# Patient Record
Sex: FEMALE | Race: WHITE | Employment: OTHER | URBAN - METROPOLITAN AREA
[De-identification: names, ages, dates, MRNs, and addresses within clinical notes are randomized per-mention and may not be internally consistent; named-entity substitution may affect disease eponyms.]

---

## 2021-04-21 ENCOUNTER — RECORDS - HEALTHEAST (OUTPATIENT)
Dept: LAB | Facility: CLINIC | Age: 66
End: 2021-04-21

## 2021-04-22 ENCOUNTER — RECORDS - HEALTHEAST (OUTPATIENT)
Dept: LAB | Facility: CLINIC | Age: 66
End: 2021-04-22

## 2021-04-23 LAB
ALBUMIN SERPL-MCNC: 2.1 G/DL (ref 3.5–5)
ALP SERPL-CCNC: 99 U/L (ref 45–120)
ALT SERPL W P-5'-P-CCNC: 13 U/L (ref 0–45)
ANION GAP SERPL CALCULATED.3IONS-SCNC: 8 MMOL/L (ref 5–18)
AST SERPL W P-5'-P-CCNC: 45 U/L (ref 0–40)
BILIRUB SERPL-MCNC: 4.4 MG/DL (ref 0–1)
BUN SERPL-MCNC: 10 MG/DL (ref 8–22)
CALCIUM SERPL-MCNC: 7.8 MG/DL (ref 8.5–10.5)
CHLORIDE BLD-SCNC: 105 MMOL/L (ref 98–107)
CO2 SERPL-SCNC: 21 MMOL/L (ref 22–31)
CREAT SERPL-MCNC: 0.55 MG/DL (ref 0.6–1.1)
ERYTHROCYTE [DISTWIDTH] IN BLOOD BY AUTOMATED COUNT: 17.9 % (ref 11–14.5)
GAMMA INTERFERON BACKGROUND BLD IA-ACNC: 0.04 IU/ML
GFR SERPL CREATININE-BSD FRML MDRD: >60 ML/MIN/1.73M2
GLUCOSE BLD-MCNC: 62 MG/DL (ref 70–125)
HCT VFR BLD AUTO: 24.7 % (ref 35–47)
HGB BLD-MCNC: 7.9 G/DL (ref 12–16)
M TB IFN-G BLD-IMP: NEGATIVE
MCH RBC QN AUTO: 32.4 PG (ref 27–34)
MCHC RBC AUTO-ENTMCNC: 32 G/DL (ref 32–36)
MCV RBC AUTO: 101 FL (ref 80–100)
MITOGEN IGNF BCKGRD COR BLD-ACNC: -0.01 IU/ML
MITOGEN IGNF BCKGRD COR BLD-ACNC: 0 IU/ML
PLATELET # BLD AUTO: 264 THOU/UL (ref 140–440)
PMV BLD AUTO: 9.2 FL (ref 8.5–12.5)
POTASSIUM BLD-SCNC: 3.4 MMOL/L (ref 3.5–5)
PROT SERPL-MCNC: 6.2 G/DL (ref 6–8)
QTF INTERPRETATION: NORMAL
QTF MITOGEN - NIL: 1.88 IU/ML
RBC # BLD AUTO: 2.44 MILL/UL (ref 3.8–5.4)
SODIUM SERPL-SCNC: 134 MMOL/L (ref 136–145)
WBC: 8.2 THOU/UL (ref 4–11)

## 2021-04-27 DIAGNOSIS — Z11.59 ENCOUNTER FOR SCREENING FOR OTHER VIRAL DISEASES: ICD-10-CM

## 2021-04-28 ENCOUNTER — RECORDS - HEALTHEAST (OUTPATIENT)
Dept: LAB | Facility: CLINIC | Age: 66
End: 2021-04-28

## 2021-04-28 LAB
C DIFF TOX B STL QL: NEGATIVE
RIBOTYPE 027/NAP1/BI: NORMAL

## 2021-05-01 ENCOUNTER — RECORDS - HEALTHEAST (OUTPATIENT)
Dept: LAB | Facility: CLINIC | Age: 66
End: 2021-05-01

## 2021-05-03 ENCOUNTER — HOSPITAL ENCOUNTER (OUTPATIENT)
Dept: ULTRASOUND IMAGING | Facility: CLINIC | Age: 66
Discharge: HOME | End: 2021-05-03
Attending: GENERAL PRACTICE | Admitting: GENERAL PRACTICE
Payer: MEDICARE

## 2021-05-03 VITALS — DIASTOLIC BLOOD PRESSURE: 69 MMHG | SYSTOLIC BLOOD PRESSURE: 132 MMHG

## 2021-05-03 DIAGNOSIS — K70.30 ALCOHOLIC CIRRHOSIS OF LIVER (H): ICD-10-CM

## 2021-05-03 LAB — HGB BLD-MCNC: 7.4 G/DL (ref 12–16)

## 2021-05-03 PROCEDURE — 250N000009 HC RX 250: Performed by: RADIOLOGY

## 2021-05-03 PROCEDURE — 49083 ABD PARACENTESIS W/IMAGING: CPT

## 2021-05-03 RX ADMIN — LIDOCAINE HYDROCHLORIDE 10 ML: 10 INJECTION, SOLUTION EPIDURAL; INFILTRATION; INTRACAUDAL; PERINEURAL at 12:55

## 2021-05-03 NOTE — PROCEDURES
[facility]    Procedure: Paracentesis.     Date/Time: 5/3/2021 1:18 PM  Performed by: Silvina Torres DO  Authorized by: Silvina Torres DO     UNIVERSAL PROTOCOL   Site Marked: Yes  Prior Images Obtained and Reviewed:  Yes  Required items: Required blood products, implants, devices and special equipment available    Patient identity confirmed:  Verbally with patient, arm band, provided demographic data and hospital-assigned identification number  Patient was reevaluated immediately before administering moderate or deep sedation or anesthesia  Confirmation Checklist:  Patient's identity using two indicators, relevant allergies, procedure was appropriate and matched the consent or emergent situation and correct equipment/implants were available  Time out: Immediately prior to the procedure a time out was called    Universal Protocol: the Joint Commission Universal Protocol was followed    Preparation: Patient was prepped and draped in usual sterile fashion           ANESTHESIA    Anesthesia: Local infiltration  Local Anesthetic:  Lidocaine 1% without epinephrine      SEDATION    Patient Sedated: No    See dictated procedure note for full details.  Findings: Paracentesis.     Specimens: none    Complications: None    Condition: Stable    PROCEDURE   Patient Tolerance:  Patient tolerated the procedure well with no immediate complications    Length of time physician/provider present for 1:1 monitoring during sedation: 0

## 2021-05-10 ENCOUNTER — RECORDS - HEALTHEAST (OUTPATIENT)
Dept: LAB | Facility: CLINIC | Age: 66
End: 2021-05-10

## 2021-05-10 LAB
C DIFF TOX B STL QL: NEGATIVE
RIBOTYPE 027/NAP1/BI: NORMAL

## 2021-05-12 ENCOUNTER — RECORDS - HEALTHEAST (OUTPATIENT)
Dept: LAB | Facility: CLINIC | Age: 66
End: 2021-05-12

## 2021-05-13 ENCOUNTER — HOSPITAL ENCOUNTER (OUTPATIENT)
Facility: CLINIC | Age: 66
Setting detail: OBSERVATION
Discharge: NURSING, REHAB OR LONG-TERM CARE | End: 2021-05-14
Attending: EMERGENCY MEDICINE | Admitting: INTERNAL MEDICINE
Payer: MEDICARE

## 2021-05-13 DIAGNOSIS — D64.9 ANEMIA, UNSPECIFIED TYPE: ICD-10-CM

## 2021-05-13 DIAGNOSIS — K70.31 ALCOHOLIC CIRRHOSIS OF LIVER WITH ASCITES (H): ICD-10-CM

## 2021-05-13 DIAGNOSIS — M62.81 GENERALIZED MUSCLE WEAKNESS: ICD-10-CM

## 2021-05-13 LAB
ABO + RH BLD: NORMAL
ABO + RH BLD: NORMAL
ALBUMIN SERPL-MCNC: 1.9 G/DL (ref 3.5–5)
ALBUMIN SERPL-MCNC: 2.1 G/DL (ref 3.4–5)
ALP SERPL-CCNC: 122 U/L (ref 45–120)
ALP SERPL-CCNC: 133 U/L (ref 40–150)
ALT SERPL W P-5'-P-CCNC: 22 U/L (ref 0–45)
ALT SERPL W P-5'-P-CCNC: 33 U/L (ref 0–50)
AMMONIA PLAS-SCNC: 58 UMOL/L (ref 10–50)
ANION GAP SERPL CALCULATED.3IONS-SCNC: 5 MMOL/L (ref 3–14)
ANION GAP SERPL CALCULATED.3IONS-SCNC: 8 MMOL/L (ref 5–18)
APPEARANCE FLD: CLEAR
AST SERPL W P-5'-P-CCNC: 49 U/L (ref 0–40)
AST SERPL W P-5'-P-CCNC: 95 U/L (ref 0–45)
BASOPHILS # BLD AUTO: 0.1 10E9/L (ref 0–0.2)
BASOPHILS NFR BLD AUTO: 0.8 %
BILIRUB SERPL-MCNC: 2.3 MG/DL (ref 0–1)
BILIRUB SERPL-MCNC: 2.8 MG/DL (ref 0.2–1.3)
BLD GP AB SCN SERPL QL: NORMAL
BLD PROD TYP BPU: NORMAL
BLD PROD TYP BPU: NORMAL
BLD UNIT ID BPU: 0
BLOOD BANK CMNT PATIENT-IMP: NORMAL
BLOOD PRODUCT CODE: NORMAL
BPU ID: NORMAL
BUN SERPL-MCNC: 10 MG/DL (ref 7–30)
BUN SERPL-MCNC: 13 MG/DL (ref 8–22)
CALCIUM SERPL-MCNC: 7.6 MG/DL (ref 8.5–10.5)
CALCIUM SERPL-MCNC: 8.1 MG/DL (ref 8.5–10.1)
CHLORIDE BLD-SCNC: 103 MMOL/L (ref 98–107)
CHLORIDE SERPL-SCNC: 103 MMOL/L (ref 94–109)
CO2 SERPL-SCNC: 25 MMOL/L (ref 20–32)
CO2 SERPL-SCNC: 25 MMOL/L (ref 22–31)
COLOR FLD: YELLOW
CREAT SERPL-MCNC: 0.65 MG/DL (ref 0.52–1.04)
CREAT SERPL-MCNC: 0.65 MG/DL (ref 0.6–1.1)
DIFFERENTIAL METHOD BLD: ABNORMAL
EOSINOPHIL # BLD AUTO: 0.2 10E9/L (ref 0–0.7)
EOSINOPHIL NFR BLD AUTO: 2 %
ERYTHROCYTE [DISTWIDTH] IN BLOOD BY AUTOMATED COUNT: 15.9 % (ref 11–14.5)
ERYTHROCYTE [DISTWIDTH] IN BLOOD BY AUTOMATED COUNT: 16.1 % (ref 10–15)
FERRITIN SERPL-MCNC: 287 NG/ML (ref 8–252)
GFR SERPL CREATININE-BSD FRML MDRD: >60 ML/MIN/1.73M2
GFR SERPL CREATININE-BSD FRML MDRD: >90 ML/MIN/{1.73_M2}
GLUCOSE BLD-MCNC: 95 MG/DL (ref 70–125)
GLUCOSE SERPL-MCNC: 112 MG/DL (ref 70–99)
HCT VFR BLD AUTO: 20 % (ref 35–47)
HCT VFR BLD AUTO: 23.9 % (ref 35–47)
HEMOCCULT STL QL: POSITIVE
HGB BLD-MCNC: 6.3 G/DL (ref 12–16)
HGB BLD-MCNC: 6.5 G/DL (ref 11.7–15.7)
HGB BLD-MCNC: 7.4 G/DL (ref 11.7–15.7)
IMM GRANULOCYTES # BLD: 0 10E9/L (ref 0–0.4)
IMM GRANULOCYTES NFR BLD: 0.3 %
IRON SATN MFR SERPL: 66 % (ref 15–46)
IRON SERPL-MCNC: 103 UG/DL (ref 35–180)
LABORATORY COMMENT REPORT: NORMAL
LYMPHOCYTES # BLD AUTO: 1.7 10E9/L (ref 0.8–5.3)
LYMPHOCYTES NFR BLD AUTO: 18.3 %
LYMPHOCYTES NFR FLD MANUAL: 46 %
MCH RBC QN AUTO: 33.2 PG (ref 27–34)
MCH RBC QN AUTO: 33.5 PG (ref 26.5–33)
MCHC RBC AUTO-ENTMCNC: 31 G/DL (ref 31.5–36.5)
MCHC RBC AUTO-ENTMCNC: 31.5 G/DL (ref 32–36)
MCV RBC AUTO: 105 FL (ref 80–100)
MCV RBC AUTO: 108 FL (ref 78–100)
MONOCYTES # BLD AUTO: 0.7 10E9/L (ref 0–1.3)
MONOCYTES NFR BLD AUTO: 7.4 %
MONOS+MACROS NFR FLD MANUAL: 11 %
NEUTROPHILS # BLD AUTO: 6.4 10E9/L (ref 1.6–8.3)
NEUTROPHILS NFR BLD AUTO: 71.2 %
NEUTS BAND NFR FLD MANUAL: 43 %
NRBC # BLD AUTO: 0 10*3/UL
NRBC BLD AUTO-RTO: 0 /100
NUM BPU REQUESTED: 1
PLATELET # BLD AUTO: 195 THOU/UL (ref 140–440)
PLATELET # BLD AUTO: 215 10E9/L (ref 150–450)
PMV BLD AUTO: 9.2 FL (ref 8.5–12.5)
POTASSIUM BLD-SCNC: 3.9 MMOL/L (ref 3.5–5)
POTASSIUM SERPL-SCNC: 4.2 MMOL/L (ref 3.4–5.3)
PROT SERPL-MCNC: 6.1 G/DL (ref 6–8)
PROT SERPL-MCNC: 7.1 G/DL (ref 6.8–8.8)
RBC # BLD AUTO: 1.9 MILL/UL (ref 3.8–5.4)
RBC # BLD AUTO: 2.21 10E12/L (ref 3.8–5.2)
RETICS # AUTO: 138 10E9/L (ref 25–95)
RETICS/RBC NFR AUTO: 6 % (ref 0.5–2)
SARS-COV-2 RNA RESP QL NAA+PROBE: NEGATIVE
SODIUM SERPL-SCNC: 133 MMOL/L (ref 133–144)
SODIUM SERPL-SCNC: 136 MMOL/L (ref 136–145)
SPECIMEN EXP DATE BLD: NORMAL
SPECIMEN SOURCE FLD: NORMAL
SPECIMEN SOURCE: NORMAL
TIBC SERPL-MCNC: 157 UG/DL (ref 240–430)
TRANSFUSION STATUS PATIENT QL: NORMAL
TRANSFUSION STATUS PATIENT QL: NORMAL
WBC # BLD AUTO: 9 10E9/L (ref 4–11)
WBC # FLD AUTO: 82 /UL
WBC: 8.2 THOU/UL (ref 4–11)

## 2021-05-13 PROCEDURE — 36415 COLL VENOUS BLD VENIPUNCTURE: CPT | Performed by: INTERNAL MEDICINE

## 2021-05-13 PROCEDURE — G0378 HOSPITAL OBSERVATION PER HR: HCPCS

## 2021-05-13 PROCEDURE — C9803 HOPD COVID-19 SPEC COLLECT: HCPCS

## 2021-05-13 PROCEDURE — 85045 AUTOMATED RETICULOCYTE COUNT: CPT | Performed by: EMERGENCY MEDICINE

## 2021-05-13 PROCEDURE — 87635 SARS-COV-2 COVID-19 AMP PRB: CPT | Performed by: EMERGENCY MEDICINE

## 2021-05-13 PROCEDURE — 83550 IRON BINDING TEST: CPT | Performed by: EMERGENCY MEDICINE

## 2021-05-13 PROCEDURE — 82728 ASSAY OF FERRITIN: CPT | Performed by: EMERGENCY MEDICINE

## 2021-05-13 PROCEDURE — 250N000013 HC RX MED GY IP 250 OP 250 PS 637: Performed by: INTERNAL MEDICINE

## 2021-05-13 PROCEDURE — 96376 TX/PRO/DX INJ SAME DRUG ADON: CPT

## 2021-05-13 PROCEDURE — 250N000011 HC RX IP 250 OP 636: Performed by: INTERNAL MEDICINE

## 2021-05-13 PROCEDURE — 86850 RBC ANTIBODY SCREEN: CPT | Performed by: EMERGENCY MEDICINE

## 2021-05-13 PROCEDURE — 96365 THER/PROPH/DIAG IV INF INIT: CPT

## 2021-05-13 PROCEDURE — 85025 COMPLETE CBC W/AUTO DIFF WBC: CPT | Performed by: EMERGENCY MEDICINE

## 2021-05-13 PROCEDURE — 49082 ABD PARACENTESIS: CPT

## 2021-05-13 PROCEDURE — C9113 INJ PANTOPRAZOLE SODIUM, VIA: HCPCS | Performed by: EMERGENCY MEDICINE

## 2021-05-13 PROCEDURE — 36430 TRANSFUSION BLD/BLD COMPNT: CPT

## 2021-05-13 PROCEDURE — 86923 COMPATIBILITY TEST ELECTRIC: CPT | Performed by: EMERGENCY MEDICINE

## 2021-05-13 PROCEDURE — 80053 COMPREHEN METABOLIC PANEL: CPT | Performed by: EMERGENCY MEDICINE

## 2021-05-13 PROCEDURE — 83540 ASSAY OF IRON: CPT | Performed by: EMERGENCY MEDICINE

## 2021-05-13 PROCEDURE — 87070 CULTURE OTHR SPECIMN AEROBIC: CPT | Performed by: EMERGENCY MEDICINE

## 2021-05-13 PROCEDURE — 85018 HEMOGLOBIN: CPT | Performed by: INTERNAL MEDICINE

## 2021-05-13 PROCEDURE — 82272 OCCULT BLD FECES 1-3 TESTS: CPT | Performed by: EMERGENCY MEDICINE

## 2021-05-13 PROCEDURE — C9113 INJ PANTOPRAZOLE SODIUM, VIA: HCPCS | Performed by: INTERNAL MEDICINE

## 2021-05-13 PROCEDURE — 99220 PR INITIAL OBSERVATION CARE,LEVEL III: CPT | Performed by: INTERNAL MEDICINE

## 2021-05-13 PROCEDURE — 86901 BLOOD TYPING SEROLOGIC RH(D): CPT | Performed by: EMERGENCY MEDICINE

## 2021-05-13 PROCEDURE — 99285 EMERGENCY DEPT VISIT HI MDM: CPT | Mod: 25

## 2021-05-13 PROCEDURE — 89051 BODY FLUID CELL COUNT: CPT | Performed by: EMERGENCY MEDICINE

## 2021-05-13 PROCEDURE — 86900 BLOOD TYPING SEROLOGIC ABO: CPT | Performed by: EMERGENCY MEDICINE

## 2021-05-13 PROCEDURE — 250N000011 HC RX IP 250 OP 636: Performed by: EMERGENCY MEDICINE

## 2021-05-13 PROCEDURE — 96375 TX/PRO/DX INJ NEW DRUG ADDON: CPT | Mod: 59

## 2021-05-13 PROCEDURE — 82140 ASSAY OF AMMONIA: CPT | Performed by: EMERGENCY MEDICINE

## 2021-05-13 PROCEDURE — P9016 RBC LEUKOCYTES REDUCED: HCPCS | Performed by: EMERGENCY MEDICINE

## 2021-05-13 RX ORDER — ONDANSETRON 4 MG/1
4 TABLET, ORALLY DISINTEGRATING ORAL EVERY 8 HOURS PRN
COMMUNITY

## 2021-05-13 RX ORDER — SPIRONOLACTONE 100 MG/1
100 TABLET, FILM COATED ORAL DAILY
COMMUNITY

## 2021-05-13 RX ORDER — LACTULOSE 10 G/15ML
20 SOLUTION ORAL DAILY
COMMUNITY

## 2021-05-13 RX ORDER — MULTIVITAMIN,THER AND MINERALS
1 TABLET ORAL DAILY
COMMUNITY

## 2021-05-13 RX ORDER — ALBUMIN (HUMAN) 12.5 G/50ML
SOLUTION INTRAVENOUS PRN
COMMUNITY

## 2021-05-13 RX ORDER — LIDOCAINE HYDROCHLORIDE 10 MG/ML
INJECTION, SOLUTION INFILTRATION; PERINEURAL
Status: DISCONTINUED
Start: 2021-05-13 | End: 2021-05-13 | Stop reason: HOSPADM

## 2021-05-13 RX ORDER — DIGOXIN 125 MCG
125 TABLET ORAL DAILY
COMMUNITY

## 2021-05-13 RX ORDER — NALOXONE HYDROCHLORIDE 0.4 MG/ML
0.2 INJECTION, SOLUTION INTRAMUSCULAR; INTRAVENOUS; SUBCUTANEOUS
Status: DISCONTINUED | OUTPATIENT
Start: 2021-05-13 | End: 2021-05-14 | Stop reason: HOSPADM

## 2021-05-13 RX ORDER — UBIDECARENONE 75 MG
100 CAPSULE ORAL DAILY
COMMUNITY

## 2021-05-13 RX ORDER — LACTULOSE 10 G/15ML
20 SOLUTION ORAL 2 TIMES DAILY
Status: DISCONTINUED | OUTPATIENT
Start: 2021-05-13 | End: 2021-05-14 | Stop reason: HOSPADM

## 2021-05-13 RX ORDER — FLUTICASONE PROPIONATE 50 MCG
2 SPRAY, SUSPENSION (ML) NASAL DAILY
COMMUNITY

## 2021-05-13 RX ORDER — FUROSEMIDE 20 MG
20 TABLET ORAL DAILY
COMMUNITY

## 2021-05-13 RX ORDER — LIDOCAINE 4 G/G
1 PATCH TOPICAL DAILY PRN
COMMUNITY

## 2021-05-13 RX ORDER — LEVETIRACETAM 750 MG/1
750 TABLET ORAL 2 TIMES DAILY
COMMUNITY

## 2021-05-13 RX ORDER — NALOXONE HYDROCHLORIDE 0.4 MG/ML
0.4 INJECTION, SOLUTION INTRAMUSCULAR; INTRAVENOUS; SUBCUTANEOUS
Status: DISCONTINUED | OUTPATIENT
Start: 2021-05-13 | End: 2021-05-14 | Stop reason: HOSPADM

## 2021-05-13 RX ORDER — ACETAMINOPHEN 325 MG/1
650 TABLET ORAL EVERY 4 HOURS PRN
Status: DISCONTINUED | OUTPATIENT
Start: 2021-05-13 | End: 2021-05-14 | Stop reason: HOSPADM

## 2021-05-13 RX ORDER — BACLOFEN 10 MG/1
10 TABLET ORAL
Status: DISCONTINUED | OUTPATIENT
Start: 2021-05-13 | End: 2021-05-14 | Stop reason: HOSPADM

## 2021-05-13 RX ORDER — ASCORBIC ACID 500 MG
500 TABLET ORAL DAILY
COMMUNITY

## 2021-05-13 RX ORDER — ONDANSETRON 4 MG/1
4 TABLET, ORALLY DISINTEGRATING ORAL EVERY 6 HOURS PRN
Status: DISCONTINUED | OUTPATIENT
Start: 2021-05-13 | End: 2021-05-14 | Stop reason: HOSPADM

## 2021-05-13 RX ORDER — ONDANSETRON 2 MG/ML
4 INJECTION INTRAMUSCULAR; INTRAVENOUS EVERY 6 HOURS PRN
Status: DISCONTINUED | OUTPATIENT
Start: 2021-05-13 | End: 2021-05-14 | Stop reason: HOSPADM

## 2021-05-13 RX ORDER — CEFTRIAXONE 2 G/1
2 INJECTION, POWDER, FOR SOLUTION INTRAMUSCULAR; INTRAVENOUS ONCE
Status: COMPLETED | OUTPATIENT
Start: 2021-05-13 | End: 2021-05-13

## 2021-05-13 RX ORDER — OMEPRAZOLE 40 MG/1
40 CAPSULE, DELAYED RELEASE ORAL
COMMUNITY

## 2021-05-13 RX ORDER — LOPERAMIDE HYDROCHLORIDE 2 MG/1
TABLET ORAL 4 TIMES DAILY PRN
COMMUNITY

## 2021-05-13 RX ORDER — DIGOXIN 0.25 MG/ML
125 INJECTION INTRAMUSCULAR; INTRAVENOUS DAILY
Status: DISCONTINUED | OUTPATIENT
Start: 2021-05-14 | End: 2021-05-14

## 2021-05-13 RX ORDER — LACTOSE-REDUCED FOOD
240 LIQUID (ML) ORAL 3 TIMES DAILY
COMMUNITY

## 2021-05-13 RX ORDER — ESCITALOPRAM OXALATE 20 MG/1
20 TABLET ORAL DAILY
COMMUNITY

## 2021-05-13 RX ORDER — BACLOFEN 10 MG/1
10 TABLET ORAL 3 TIMES DAILY
COMMUNITY

## 2021-05-13 RX ORDER — ALBUTEROL SULFATE 90 UG/1
2 AEROSOL, METERED RESPIRATORY (INHALATION) EVERY 4 HOURS PRN
COMMUNITY

## 2021-05-13 RX ORDER — ACETAMINOPHEN 500 MG
1000 TABLET ORAL 2 TIMES DAILY PRN
COMMUNITY

## 2021-05-13 RX ADMIN — PANTOPRAZOLE SODIUM 40 MG: 40 INJECTION, POWDER, FOR SOLUTION INTRAVENOUS at 20:45

## 2021-05-13 RX ADMIN — CEFTRIAXONE 2 G: 2 INJECTION, POWDER, FOR SOLUTION INTRAMUSCULAR; INTRAVENOUS at 16:01

## 2021-05-13 RX ADMIN — LEVETIRACETAM 750 MG: 250 TABLET, FILM COATED ORAL at 20:45

## 2021-05-13 RX ADMIN — BACLOFEN 10 MG: 10 TABLET ORAL at 20:45

## 2021-05-13 RX ADMIN — LACTULOSE 20 G: 20 SOLUTION ORAL at 20:45

## 2021-05-13 RX ADMIN — PANTOPRAZOLE SODIUM 40 MG: 40 INJECTION, POWDER, FOR SOLUTION INTRAVENOUS at 16:01

## 2021-05-13 ASSESSMENT — ENCOUNTER SYMPTOMS
FATIGUE: 1
FEVER: 0
BLOOD IN STOOL: 0
COUGH: 0
SHORTNESS OF BREATH: 0
VOMITING: 0
ABDOMINAL PAIN: 1
DIZZINESS: 1

## 2021-05-13 ASSESSMENT — MIFFLIN-ST. JEOR: SCORE: 1198.17

## 2021-05-13 NOTE — ED NOTES
DATE:  5/13/2021   TIME OF RECEIPT FROM LAB:  1510  LAB TEST:  Occult   LAB VALUE:  postive  RESULTS GIVEN WITH READ-BACK TO (PROVIDER):  Palomo   TIME LAB VALUE REPORTED TO PROVIDER:   2265

## 2021-05-13 NOTE — ED NOTES
Called pt care facility to update about pt admission, Facility nurse not there, note left with  to give call back and update facility nurse about pt admission

## 2021-05-13 NOTE — ED NOTES
[facility]  ED Nurse Handoff Report    Xiomy Bai is a 65 year old female   ED Chief complaint: Abnormal Labs  . ED Diagnosis:   Final diagnoses:   Generalized muscle weakness   Alcoholic cirrhosis of liver with ascites (H)   Anemia, unspecified type     Allergies:   Allergies   Allergen Reactions     Asacol [Mesalamine]      Aspirin      Other reaction(s): *Unknown     Desvenlafaxine Other (See Comments)     Other reaction(s): Dizziness  dizzy  dizzy       Ibuprofen      Other reaction(s): *Unknown     Lisinopril Cough     Pollen Extract      Other reaction(s): Runny Nose  Itchy eyes and headache     Sulfa Drugs      Sulfasalazine      Other reaction(s): *Unknown       Code Status: Full Code  Activity level - Baseline/Home:  Independent. Activity Level - Current:   Assist X 2. Lift room needed: No. Bariatric: No   Needed: No   Isolation: No. Infection: Not Applicable.     Vital Signs:   Vitals:    05/13/21 1550 05/13/21 1600 05/13/21 1610 05/13/21 1620   BP:   111/58    Pulse: 84 82 86 86   Resp: 20 20 21 19   Temp:       TempSrc:       SpO2: 96% 99% 96% 97%       Cardiac Rhythm:  ,      Pain level:    Patient confused: Yes. Patient Falls Risk: Yes.   Elimination Status: Not in ED  Patient Report - Initial Complaint: Abnormal Labs . Focused Assessment: Xiomy Bai is a 65 year old female with history of alcohol cirrhosis requiring routine fluid draining who was admitted to [facility] from 3/24-4/20, diagnosed with altered mental status, left lower lobe pneumonia, alcoholic cirrhosis with failure to thrive, and possible seizure and discharged to TCU who presents via EMS with anemia. Per the patient, she had a routine blood draw at her assisted living facility and they noted she was anemic with a hemoglobin of 6. She states she has felt fatigued and dizzy for the past year, which has worsened since her discharge from the hospital. She also notes abdominal pain but this is related to her  fluid build-up and has not worsened recently. She denies blood in her stools, vomiting, fever, cough, shortness of breath, and chest pain. She states her last fluid drainage was a while ago. She notes she has had upper GI tubes in the past with band insertion, but also notes the last few times she has had this the doctors noted no bleeding.     Review of Systems   Constitutional: Positive for fatigue. Negative for fever.   Respiratory: Negative for cough and shortness of breath.    Cardiovascular: Negative for chest pain.   Gastrointestinal: Positive for abdominal pain. Negative for blood in stool and vomiting.   Neurological: Positive for dizziness.   All other systems reviewed and are negative.   Tests Performed: EKG, Labs. Abnormal Results:   Labs Ordered and Resulted from Time of ED Arrival Up to the Time of Departure from the ED   CBC WITH PLATELETS DIFFERENTIAL - Abnormal; Notable for the following components:       Result Value    RBC Count 2.21 (*)     Hemoglobin 7.4 (*)     Hematocrit 23.9 (*)      (*)     MCH 33.5 (*)     MCHC 31.0 (*)     RDW 16.1 (*)     All other components within normal limits   COMPREHENSIVE METABOLIC PANEL - Abnormal; Notable for the following components:    Glucose 112 (*)     Calcium 8.1 (*)     Bilirubin Total 2.8 (*)     Albumin 2.1 (*)     AST 95 (*)     All other components within normal limits   AMMONIA - Abnormal; Notable for the following components:    Ammonia 58 (*)     All other components within normal limits   OCCULT BLOOD STOOL - Abnormal; Notable for the following components:    Occult Blood Positive (*)     All other components within normal limits   IRON AND IRON BINDING CAPACITY - Abnormal; Notable for the following components:    Iron Binding Cap 157 (*)     Iron Saturation Index 66 (*)     All other components within normal limits   FERRITIN - Abnormal; Notable for the following components:    Ferritin 287 (*)     All other components within normal limits    SARS-COV-2 (COVID-19) VIRUS RT-PCR   ABO/RH TYPE AND SCREEN   CELL COUNT WITH DIFFERENTIAL FLUID   FLUID CULTURE AEROBIC BACTERIAL     .   Treatments provided: See MAR   Family Comments: N/A  OBS brochure/video discussed/provided to patient:  Yes, pt confused but observation explained to pt   ED Medications:   Medications   lidocaine 1 % injection (has no administration in time range)   cefTRIAXone (ROCEPHIN) 2 g vial to attach to  ml bag for ADULTS or NS 50 ml bag for PEDS (2 g Intravenous New Bag 5/13/21 1601)   pantoprazole (PROTONIX) IV push injection 40 mg (40 mg Intravenous Given 5/13/21 1601)     Drips infusing:  Yes  For the majority of the shift, the patient's behavior Green. Interventions performed were N/A.    Sepsis treatment initiated: No     Patient tested for COVID 19 prior to admission: YES    ED Nurse Name/Phone Number: Lianna Esteban RN,   4:25 PM  RECEIVING UNIT ED HANDOFF REVIEW    Above ED Nurse Handoff Report was reviewed: Yes  Reviewed by: Silvina Watts RN on May 13, 2021 at 6:13 PM

## 2021-05-13 NOTE — H&P
[facility]  Hospitalist Admission Note  Name: Xiomy Bai    MRN: [id number]  YOB: 1955    Age: 65 year old  Date of admission: 5/13/2021  Primary care provider: Diana Bañuelos    Chief Complaint:  fatigue    Assessment and Plan:   Symptomatic anemia, possible subacute UGIB, esophageal varices, Crohn's disease: She was having some generalized fatigue along with some lightheadedness when up although she tells me this has been present for 1 year.  Hemoglobin 7.4 down from 8 range in April after she received multiple transfusions for lower GI bleed and like hematoma.  Denies any melena or hematochezia, however stool occult blood is positive.  She is on omeprazole twice daily.  Does have history of esophageal varices with 2 small ones seen on EGD 4/14.  She also underwent colonoscopy that day showed 2 small rectal ulcerations from the rectal tube.  Per report she had hemoglobin in the 6 range when checked at the TCU today so she was sent in.  I do see Crohn's disease listed in her history tabs through [facility], however not on any medications for this.  -Recheck hemoglobin at 8 PM and if it is decreasing I recommended transfusion due to symptomatic anemia which she is in agreement  -IV Protonix 40 mg twice daily  -Consult [facility] who she saw at [facility] last month.  N.p.o. at midnight in case of EGD recommended  -Doubt any rapid GI bleed at this time so less likely to be variceal bleeding, therefore held off on octreotide and ceftriaxone (she did receive 1 dose ceftriaxone in the ER prior to paracentesis fluid results)  Addendum: Hemoglobin down to 6.5 similar to outside lab report of hemoglobin in the 6 range.  Transfuse 1 unit RBCs.  CBC tomorrow or hemoglobin earlier if develops any hypotension, tachycardia, or bloody stools.    Liver failure, cirrhosis secondary to alcohol: Patient is liver failure secondary to alcohol.  Her bilirubin is 2.8 down from 3-4 range last month.   AST 95.  INR to be obtained, usually 1.3-1.5.  She reports frequent paracentesis needed as an outpatient every few days or whenever it fills up, I cannot confirm this as her last paracentesis was through [facility] on 5/3 when she had 1.5 L fluid removed.  She has had previous hepatic encephalopathy and ammonia level is slightly elevated currently 58 fairly similar to baseline levels.  She does not have any asterixis on exam and although she is abrupt during the interview she does not appear confused.  Previous history of esophageal varices requiring banding.  She also has 1+ bilateral lower extremity pitting edema.  She is on Lasix 20 mg daily and spironolactone 100 mg daily.  She did just recently have increase in Lasix dose for 1 week from 4/28-5/4 at 60 mg daily and weight came down to 129 pounds and has been stable.  -Possible slow upper GI bleed due to decreasing hemoglobin and stool occult blood positive  -Presuming no significant blood loss while here she should be able to resume her spironolactone and Lasix tomorrow as her BP is 121/61 when she has been 100-115 systolic the past few weeks at the TCU.  -Check an INR  -Increase her lactulose from 20 g daily to 20 g twice daily with dose this evening and check ammonia level tomorrow morning  -She reports some diffuse abdominal pain although this is not new, diagnostic paracentesis in the ER only shows 82 WBCs with 43% neutrophils not consistent with SBP.  She is afebrile and does not have leukocytosis.  She did receive a dose of IV ceftriaxone in the ER that will not be continued.    History A. fib RVR: Episode of A. fib with RVR while hospitalized last month at [facility].  She continues on digoxin 125 mcg daily.  Not on any anticoagulation due to GI bleed history.  Her pulse is regular in the ER.  -Continue digoxin, check level in the morning    COPD: On albuterol inhaler and Flonase at baseline.  No sign of acute exacerbation.    History of possible  seizure: She was started on Keppra 750 mg twice daily during last admission at [facility] for possible seizure seen on EEG and confusion.  -Continue Keppra, check level in the morning    Chronic back and neck pain: Resume her acetaminophen as needed, baclofen 10 mg 3 times daily, and oxycodone 2.5 mg every 6 hours as needed that she had been receiving at the TCU.    MDD, anxiety, bipolar disorder: Not sure if she is on Lexapro anymore, I did not see it on the TCU med rec.    Asymptomatic COVID-19 PCR negative  DVT Prophylaxis: Low Risk/Ambulatory with no VTE prophylaxis indicated  Code Status: Full Code  FEN: Regular diet then n.p.o. at midnight, no IV fluids  Discharge Dispo: Back to TCU, consult social work as patient states she is discharging home from the TCU tomorrow.  I am not sure if they are aware of this.  Estimated Disch Date / # of Days until Disch: Admit to observation for likely slow GI bleed with symptomatic anemia.  Trend hemoglobin right and consult GI.  If hemoglobin dropping may need EGD tomorrow given history of varices.  Patient says she is leaving tomorrow at 4 PM to her TCU and that she is going to discharge home even if that is against medical advice.      History of Present Illness:  Xiomy Bai is a 65 year old female with PMH including liver failure/cirrhosis secondary to alcohol, hepatic encephalopathy, Warnicke's encephalopathy, possible seizure, COPD, chronic back and neck pain, Crohn's disease, A. fib, tobacco dependence, bipolar disorder, anxiety, and anemia who presents from her TCU with hemoglobin reported in the 6 range that was checked due to fatigue and dizziness.  She was hospitalized at [facility] from 3/24-4/20 for pneumonia, failure to thrive, encephalopathy.  She reluctantly discharged to TCU where she has been since then.  She reports persistent fatigue and dizziness although she tells me this has been for the past 1 year.  Perhaps the fatigue is a little  bit more this past week or 2.  They checked a hemoglobin and this was reportedly in the 6 range so she was sent to the ER for evaluation.  She denies any nausea, vomiting, melena, hematochezia.  She does have diffuse abdominal discomfort which is chronic for her and she said this is when she is filling up fluid in her belly.  Her last paracentesis recorded through the system was 5/3 although when asked her how often she gets it she says every few days or whenever she feels up.  She cannot tell me when her last paracentesis was.  She does report some lightheadedness when up, but no previous syncope.  Denies any chest pain, cough, shortness of breath, fevers, chills.  She is frustrated about being admitted here and she says she just wants to go home.  She says she is leaving tomorrow at 4 PM and that she will be discharging from the TCU to home tomorrow after that.        History obtained from patient, medical record, and from Dr. Palomo in the emergency department.  Blood pressure 121/61, heart rate 86, temperature 98.1  F, oxygen 98% on room air.  WC 9.0, hemoglobin 7.4, platelet count 215.  Stool occult blood is positive.  BMP WNL.  AST elevated 95, ALT 33, alk phos 133, bilirubin 2.8, albumin 2.1.  Ammonia level elevated at 58 although she does not appear encephalopathic.  Ferritin 287, iron 103, iron binding capacity 157, iron saturation deck 66.  COVID-19 PCR negative.  A diagnostic paracentesis was done in the ER.  She received 2 g IV ceftriaxone while awaiting results in case of SBP.  Peritoneal fluid shows 82 WBCs with 43% neutrophils not consistent with SBP.  She did receive 40 mg IV Protonix in case of a slow upper GI bleed.  Observation bed has been requested for serial hemoglobin and possible need for transfusion and GI consultation.      Past Medical History reviewed:  Cirrhosis and liver failure secondary to alcohol  Hepatic encephalopathy  Warnicke's encephalopathy  Possible seizure  COPD  Chronic back  pain  Chronic neck pain  Tobacco dependence  A. fib with RVR  Crohn's disease  GERD  Bipolar disorder  Anxiety  Anemia    Past Surgical History reviewed:  EGD  Colonoscopy  C-spine procedure  Cataract surgery  Cholecystectomy    Social History reviewed:  Current smoker, 0.1 pack/day  Former heavy alcohol use    Family History reviewed:  Brother with history of Crohn's disease  Father with history of CAD, HTN, Alzheimer's disease  Mother with history of dementia    Allergies:  Allergies   Allergen Reactions     Asacol [Mesalamine]      Aspirin      Other reaction(s): *Unknown     Desvenlafaxine Other (See Comments)     Other reaction(s): Dizziness  dizzy  dizzy       Ibuprofen      Other reaction(s): *Unknown     Lisinopril Cough     Pollen Extract      Other reaction(s): Runny Nose  Itchy eyes and headache     Sulfa Drugs      Sulfasalazine      Other reaction(s): *Unknown     Medications:  Digoxin 125 mcg daily  Acetaminophen as needed  Albuterol HFA  Keppra 750 mg twice daily  Lasix 20 mg daily  Spironolactone 100 mg daily  Baclofen 10 mg 3 times daily  Omeprazole 40 mg daily  Flonase  Oxycodone 2.5 mg every 6 hours as needed      Review of Systems:  A Comprehensive greater than 10 system review of systems was carried out.  Pertinent positives and negatives are noted above.  Otherwise negative.     Physical Exam:  Blood pressure 121/61, pulse 82, temperature 98.1  F (36.7  C), temperature source Oral, resp. rate 20, SpO2 99 %.  Wt Readings from Last 1 Encounters:   No data found for Wt     Exam:  Constitutional: Awake, NAD   Eyes: sclera white   HEENT: atraumatic, MMM  Respiratory: no respiratory distress, lungs cta bilaterally, no crackles or wheeze  Cardiovascular: RRR.  No murmur   GI: Mildly distended, minimal diffuse abdominal tenderness to palpation without guarding bowel sounds present  Skin: Multiple spider angiomata, no rash  Musculoskeletal/extremities: atraumatic, no major deformities.  1+ bilateral  lower extremity pitting edema  Neurologic: A&O, speech clear, no asterixis  Psychiatric: Appears irritated and voices frustration    Lab and imaging data personally reviewed:  Labs:  Recent Labs   Lab 05/13/21  1427   WBC 9.0   HGB 7.4*   HCT 23.9*   *        Recent Labs   Lab 05/13/21  1427      POTASSIUM 4.2   CHLORIDE 103   CO2 25   ANIONGAP 5   *   BUN 10   CR 0.65   GFRESTIMATED >90   GFRESTBLACK >90   BLAYNE 8.1*   PROTTOTAL 7.1   ALBUMIN 2.1*   BILITOTAL 2.8*   ALKPHOS 133   AST 95*   ALT 33     Stool occult blood positive  Ammonia 58  Ferritin 287, iron 103, iron binding capacity 157, iron saturation index 66  Peritoneal fluid 82 WBCs, 43% neutrophils  COVID-19 PCR negative      Imaging:  No imaging obtained    Sherif Packer MD  Hospitalist  [facility]

## 2021-05-13 NOTE — ED NOTES
Bed: ED08  Expected date: 5/13/21  Expected time: 2:00 PM  Means of arrival: Ambulance  Comments:  [id number]

## 2021-05-13 NOTE — ED TRIAGE NOTES
"Pt comes from assisted living where pt reports a routine blood draw with a \"low hemoglobin\" Pt unsure of results but was brought in due to this. PT VSS and ABC's intact. Pt reports feeling dizzy but not worse that baseline   "

## 2021-05-13 NOTE — ED PROVIDER NOTES
History   Chief Complaint:  Abnormal Labs       HPI   Xiomy Bai is a 65 year old female with history of alcohol cirrhosis who presents to the ED for evaluation of abnormal labs.  Patient was admitted to [facility] from 3/24-4/20, diagnosed with altered mental status, left lower lobe pneumonia, alcoholic cirrhosis with failure to thrive, and possible seizure and discharged to TCU. Per the patient, she had a routine blood draw at her assisted living facility and they noted she was anemic although does not recall the number that resulted. She states she has felt fatigued and dizzy for the past year, which has worsened since her discharge from the hospital. She also notes abdominal pain but this is related to her fluid build-up and has not worsened recently. She denies blood in her stools, vomiting, fever, cough, shortness of breath, and chest pain. She states her last fluid drainage was a while ago. She notes she has had EGDs in the past which required bandingn, but also notes the last few times she has had this the doctors noted no bleeding.    Review of Systems   Constitutional: Positive for fatigue. Negative for fever.   Respiratory: Negative for cough and shortness of breath.    Cardiovascular: Negative for chest pain.   Gastrointestinal: Positive for abdominal pain. Negative for blood in stool and vomiting.   Neurological: Positive for dizziness.   All other systems reviewed and are negative.    Allergies:  Aspirin  Desvenlafaxine  Ibuprofen  Lisinopril  Asacol  Sulfa Drugs  Sulfasalazine    Medications:  Baclofen  Furosemide  Levetiracetam  Digoxin  Escitalopram oxalate  Spirinolactone  Omeprazole    Past Medical History:    Anorexia  Hepatic encephalopathy   Atrial fibrillation  Alcohol cirrhosis  COPD  Hypertension  Hyperlipidemia  Asthma  Crohn's disease  GERD  Bipolar affective disorder    Past Surgical History:    Unspecified cervical procedure  Cervical hardware removal cervical 5-6, anterior cervical  decompression fusion cervical 4-5  Cataract extraction with intraocular lens implant, left  Cholecystectomy    Family History:    Brother: Crohn's disease, kidney cancer  Father: Alzheimer's disease, unspecified heart disease, hypertension  Mother: Dementia, thyroid disease  Sister: Crohn's disease    Social History:  The patient was accompanied to the ER by EMS personnel.  The patient lives in assisted living.    Physical Exam     Patient Vitals for the past 24 hrs:   BP Temp Temp src Pulse Resp SpO2   05/13/21 1620 -- -- -- 86 19 97 %   05/13/21 1610 111/58 -- -- 86 21 96 %   05/13/21 1600 -- -- -- 82 20 99 %   05/13/21 1550 -- -- -- 84 20 96 %   05/13/21 1540 -- -- -- 81 26 98 %   05/13/21 1530 -- -- -- 86 20 98 %   05/13/21 1520 -- -- -- 85 19 97 %   05/13/21 1510 -- -- -- 83 20 98 %   05/13/21 1500 -- -- -- 86 26 98 %   05/13/21 1450 -- -- -- 85 20 99 %   05/13/21 1440 -- -- -- 84 16 99 %   05/13/21 1430 121/61 -- -- 84 21 99 %   05/13/21 1422 125/61 98.1  F (36.7  C) Oral 81 16 97 %       Physical Exam  General:              Well-nourished              Speaking in full sentences  Eyes:              Conjunctiva without injection or scleral icterus  ENT:              Moist mucous membranes              Nares patent              Pinnae normal  Neck:              Full ROM              No stiffness appreciated  Resp:              Lungs CTAB              No crackles, wheezing or audible rubs              Good air movement  CV:                    Normal rate, regular rhythm              S1 and S2 present              No murmur, gallop or rub  GI:              BS present, hyperactive              Positive fluid wave              Diffuse mild tenderness  Skin:              Warm, dry, well perfused              No rashes or open wounds on exposed skin  MSK:              Moves all extremities              No focal deformities or swelling  Neuro:              Alert              Answers questions appropriately               Moves all extremities equally  Psych:             Flat affect    Emergency Department Course     Laboratory:    CBC: WBC 9.0, HGB 7.4(L),   CMP: Glucose 112(H), Calcium 8.1(L), Bilirubin Total 2.8(H), Albumin 2.1(L), AST 95(H) o/w WNL (Creatinine 8.1(L)  Ammonia: 58(H)  Ferritin: 287(H)  Iron and iron binding capacity: Iron 103, Iron Binding Cap 157(L), Iron Saturation Index 66(H)    ABO RH Type and Screen: A Pos, antibody Neg    Occult Blood Stool: Positive(A)     Asymptomatic COVID-19 by PCR Swab: In Process    Fluid Culture Aerobic Bacterial: In Process  Cell Count with Differential Fluid: % Neutrophils 43, % Lymphocytes 46, % Mono/macro 11, Color Yellow, Appearance Clear, WBC 82    [facility]    -Paracentesis    Date/Time: 5/13/2021 3:18 PM  Performed by: Moreno Palomo MD  Authorized by: Moreno Palomo MD     ED EVALUATION:      I have performed an Emergency Department Evaluation including taking a history and physical examination, this evaluation will be documented in the electronic medical record for this ED encounter.      ASA Class: Class 3- Severe systemic disease, definite functional limitations    NPO Status: appropriately NPO for procedure  UNIVERSAL PROTOCOL   Site Marked: Yes  Prior Images Obtained and Reviewed:  Yes  Required items: Required blood products, implants, devices and special equipment available    Confirmation Checklist:  Patient's identity using two indicators, relevant allergies, procedure was appropriate and matched the consent or emergent situation and correct equipment/implants were available  Time out: Immediately prior to the procedure a time out was called    Universal Protocol: the Joint Commission Universal Protocol was followed    Preparation: Patient was prepped and draped in usual sterile fashion          PRE-PROCEDURE DETAILS     Procedure purpose:  Diagnostic    ANESTHESIA (see MAR for exact dosages):     Anesthesia method:  Local  infiltration    Local anesthetic:  Lidocaine 1% w/o epi    PROCEDURE DETAILS     Needle gauge:  22    Ultrasound guidance: yes      Puncture site:  L lower quadrant    Fluid appearance:  Yellow    Dressing:  4x4 sterile gauze    PROCEDURE   Patient Tolerance:  Patient tolerated the procedure well with no immediate complications  Describe Procedure: Universal protocol was followed as above. The skin was anesthetized and using a Z-track, the needle was advanced into the fluid collection. Fluid was withdrawn as above and the needle removed.      Emergency Department Course:    Reviewed:  I reviewed nursing notes, vitals, past medical history and care everywhere    Assessments:  1437 I obtained history and examined the patient as noted above.   1521 I performed a diagnostic paracentesis as documented above.    Consults:   1611 I spoke with Dr. Packer of the hospitalist service from [facility] regarding patient's presentation, findings, and plan of care.    Interventions:  1601: Rocephin, 2 g, IV  1601: Protonix, 40 mg, IV    Disposition:  The patient was admitted to the hospital under the care of Dr. Packer.       Impression & Plan     Medical Decision Making:  Xiomy Bai is a 65-year-old female presenting to the ED for evaluation of abnormal labs from her TCU.  History obtained from the patient as well as supplemented by chart review.  VS on presentation are unremarkable.  With regards to patient's abnormal labs, care facility reports a low hemoglobin prior to arrival, though we do not have the exact number they noted.  Laboratory studies today confirm hemoglobin of 7.4, which is down from baseline values between 9 and 10 per review of records from recent hospital stay at [facility].  Etiology for anemia includes GI sources given known portal venous gastropathy, chronic liver disease and positive hemoccult testing.  Patient will be given 1 dose of Protonix.  In light of liver dysfunction and known  ascites, she was also treated with 2 g of IV Rocephin for SBP prophylaxis.  Diagnostic paracentesis was performed demonstrating no current evidence of SBP.  Her metabolic function is largely unremarkable.  Ammonia level is mildly elevated at 58 and patient intermittently is mildly confused to place, though otherwise interacting appropriately.  Patient will require admission for ongoing serial hemoglobin monitoring and possible therapeutic paracentesis for symptom control and further trending of hemoglobin.  Patient updated and in agreement with proposed plan of care.  Questions answered prior to admission.    Covid-19  Xiomy Bai was evaluated during a global COVID-19 pandemic, which necessitated consideration that the patient might be at risk for infection with the SARS-CoV-2 virus that causes COVID-19.   Applicable protocols for evaluation were followed during the patient's care.   COVID-19 was considered as part of the patient's evaluation. The plan for testing is:  a test was obtained during this visit.    Diagnosis:    ICD-10-CM    1. Generalized muscle weakness  M62.81 ABO/Rh type and screen     Comprehensive metabolic panel     Cell count with differential fluid     Fluid Culture Aerobic Bacterial     Asymptomatic SARS-CoV-2 COVID-19 Virus (Coronavirus) by PCR     Iron and iron binding capacity     Iron and iron binding capacity     Ferritin     Ferritin     CANCELED: Iron and iron binding capacity     CANCELED: Ferritin   2. Alcoholic cirrhosis of liver with ascites (H)  K70.31    3. Anemia, unspecified type  D64.9        Scribe Disclosure:  I, Emil Goodrich, am serving as a scribe at 2:33 PM on 5/13/2021 to document services personally performed by Moreno Palomo MD based on my observations and the provider's statements to me.        Moreno Palomo MD  05/13/21 0064

## 2021-05-14 ENCOUNTER — APPOINTMENT (OUTPATIENT)
Dept: ULTRASOUND IMAGING | Facility: CLINIC | Age: 66
End: 2021-05-14
Attending: PHYSICIAN ASSISTANT
Payer: MEDICARE

## 2021-05-14 VITALS
WEIGHT: 145.1 LBS | OXYGEN SATURATION: 97 % | RESPIRATION RATE: 16 BRPM | BODY MASS INDEX: 24.77 KG/M2 | TEMPERATURE: 97.7 F | HEART RATE: 79 BPM | DIASTOLIC BLOOD PRESSURE: 48 MMHG | SYSTOLIC BLOOD PRESSURE: 108 MMHG | HEIGHT: 64 IN

## 2021-05-14 LAB
ALBUMIN SERPL-MCNC: 1.8 G/DL (ref 3.4–5)
ALP SERPL-CCNC: 100 U/L (ref 40–150)
ALT SERPL W P-5'-P-CCNC: 24 U/L (ref 0–50)
AMMONIA PLAS-SCNC: 58 UMOL/L (ref 10–50)
ANION GAP SERPL CALCULATED.3IONS-SCNC: 6 MMOL/L (ref 3–14)
AST SERPL W P-5'-P-CCNC: 47 U/L (ref 0–45)
BILIRUB SERPL-MCNC: 2.5 MG/DL (ref 0.2–1.3)
BUN SERPL-MCNC: 11 MG/DL (ref 7–30)
CALCIUM SERPL-MCNC: 7.9 MG/DL (ref 8.5–10.1)
CHLORIDE SERPL-SCNC: 105 MMOL/L (ref 94–109)
CO2 SERPL-SCNC: 26 MMOL/L (ref 20–32)
CREAT SERPL-MCNC: 0.69 MG/DL (ref 0.52–1.04)
DIGOXIN SERPL-MCNC: 0.7 UG/L (ref 0.5–2)
ERYTHROCYTE [DISTWIDTH] IN BLOOD BY AUTOMATED COUNT: 17.5 % (ref 10–15)
GFR SERPL CREATININE-BSD FRML MDRD: >90 ML/MIN/{1.73_M2}
GLUCOSE SERPL-MCNC: 86 MG/DL (ref 70–99)
HCT VFR BLD AUTO: 23.6 % (ref 35–47)
HGB BLD-MCNC: 7.6 G/DL (ref 11.7–15.7)
MCH RBC QN AUTO: 32.8 PG (ref 26.5–33)
MCHC RBC AUTO-ENTMCNC: 32.2 G/DL (ref 31.5–36.5)
MCV RBC AUTO: 102 FL (ref 78–100)
PLATELET # BLD AUTO: 226 10E9/L (ref 150–450)
POTASSIUM SERPL-SCNC: 3.5 MMOL/L (ref 3.4–5.3)
PROT SERPL-MCNC: 6.2 G/DL (ref 6.8–8.8)
RBC # BLD AUTO: 2.32 10E12/L (ref 3.8–5.2)
SODIUM SERPL-SCNC: 137 MMOL/L (ref 133–144)
WBC # BLD AUTO: 8.2 10E9/L (ref 4–11)

## 2021-05-14 PROCEDURE — 80162 ASSAY OF DIGOXIN TOTAL: CPT | Performed by: INTERNAL MEDICINE

## 2021-05-14 PROCEDURE — 250N000013 HC RX MED GY IP 250 OP 250 PS 637: Performed by: INTERNAL MEDICINE

## 2021-05-14 PROCEDURE — 99217 PR OBSERVATION CARE DISCHARGE: CPT | Performed by: PHYSICIAN ASSISTANT

## 2021-05-14 PROCEDURE — 250N000009 HC RX 250: Performed by: RADIOLOGY

## 2021-05-14 PROCEDURE — 36415 COLL VENOUS BLD VENIPUNCTURE: CPT | Performed by: INTERNAL MEDICINE

## 2021-05-14 PROCEDURE — C9113 INJ PANTOPRAZOLE SODIUM, VIA: HCPCS | Performed by: INTERNAL MEDICINE

## 2021-05-14 PROCEDURE — 250N000013 HC RX MED GY IP 250 OP 250 PS 637: Performed by: PHYSICIAN ASSISTANT

## 2021-05-14 PROCEDURE — 250N000011 HC RX IP 250 OP 636: Performed by: INTERNAL MEDICINE

## 2021-05-14 PROCEDURE — 80053 COMPREHEN METABOLIC PANEL: CPT | Performed by: INTERNAL MEDICINE

## 2021-05-14 PROCEDURE — 96376 TX/PRO/DX INJ SAME DRUG ADON: CPT

## 2021-05-14 PROCEDURE — 49083 ABD PARACENTESIS W/IMAGING: CPT

## 2021-05-14 PROCEDURE — 80177 DRUG SCRN QUAN LEVETIRACETAM: CPT | Performed by: INTERNAL MEDICINE

## 2021-05-14 PROCEDURE — G0378 HOSPITAL OBSERVATION PER HR: HCPCS

## 2021-05-14 PROCEDURE — 82140 ASSAY OF AMMONIA: CPT | Performed by: INTERNAL MEDICINE

## 2021-05-14 PROCEDURE — 85027 COMPLETE CBC AUTOMATED: CPT | Performed by: INTERNAL MEDICINE

## 2021-05-14 RX ORDER — LIDOCAINE HYDROCHLORIDE 10 MG/ML
10 INJECTION, SOLUTION EPIDURAL; INFILTRATION; INTRACAUDAL; PERINEURAL ONCE
Status: DISCONTINUED | OUTPATIENT
Start: 2021-05-14 | End: 2021-05-14

## 2021-05-14 RX ORDER — FUROSEMIDE 20 MG
20 TABLET ORAL DAILY
Status: DISCONTINUED | OUTPATIENT
Start: 2021-05-14 | End: 2021-05-14 | Stop reason: HOSPADM

## 2021-05-14 RX ORDER — UBIDECARENONE 75 MG
100 CAPSULE ORAL DAILY
Status: DISCONTINUED | OUTPATIENT
Start: 2021-05-14 | End: 2021-05-14 | Stop reason: HOSPADM

## 2021-05-14 RX ORDER — DIGOXIN 125 MCG
125 TABLET ORAL DAILY
Status: DISCONTINUED | OUTPATIENT
Start: 2021-05-14 | End: 2021-05-14 | Stop reason: HOSPADM

## 2021-05-14 RX ORDER — ESCITALOPRAM OXALATE 20 MG/1
20 TABLET ORAL DAILY
Status: DISCONTINUED | OUTPATIENT
Start: 2021-05-14 | End: 2021-05-14 | Stop reason: HOSPADM

## 2021-05-14 RX ORDER — SPIRONOLACTONE 25 MG/1
100 TABLET ORAL DAILY
Status: DISCONTINUED | OUTPATIENT
Start: 2021-05-14 | End: 2021-05-14 | Stop reason: HOSPADM

## 2021-05-14 RX ADMIN — VITAM B12 100 MCG: 100 TAB at 08:35

## 2021-05-14 RX ADMIN — LEVETIRACETAM 750 MG: 250 TABLET, FILM COATED ORAL at 08:34

## 2021-05-14 RX ADMIN — LIDOCAINE HYDROCHLORIDE 10 ML: 10 INJECTION, SOLUTION EPIDURAL; INFILTRATION; INTRACAUDAL; PERINEURAL at 10:40

## 2021-05-14 RX ADMIN — SPIRONOLACTONE 100 MG: 25 TABLET ORAL at 08:33

## 2021-05-14 RX ADMIN — ESCITALOPRAM OXALATE 20 MG: 20 TABLET ORAL at 08:34

## 2021-05-14 RX ADMIN — BACLOFEN 10 MG: 10 TABLET ORAL at 08:34

## 2021-05-14 RX ADMIN — FUROSEMIDE 20 MG: 20 TABLET ORAL at 08:33

## 2021-05-14 RX ADMIN — PANTOPRAZOLE SODIUM 40 MG: 40 INJECTION, POWDER, FOR SOLUTION INTRAVENOUS at 08:26

## 2021-05-14 RX ADMIN — BACLOFEN 10 MG: 10 TABLET ORAL at 12:31

## 2021-05-14 RX ADMIN — DIGOXIN 125 MCG: 125 TABLET ORAL at 08:34

## 2021-05-14 NOTE — CONSULTS
GASTROENTEROLOGY CONSULTATION      Xiomy Bai  [id number]   [facility] [id number]  65 year old female     Admission Date/Time: 5/13/2021  Primary Care Provider: Diana Bañuelos     We were asked to see the patient in consultation by Dr. Packer for evaluation of anemia, history of alcoholic liver cirrhosis.    CC: fatigue, dizziness     HPI:  Xiomy Bai is a 65 year old female with past medical history significant for ETOH liver cirrhosis complicated by GI bleeding, esophageal varices, ascites, and hepatic encephalopathy, reported history of Crohn's disease (not on any maintenance therapy), COPD, A fib with RVR not on anticoagulation, MDD, anxiety, bipolar disorder, possible seizure on Keppra, admitted with acute on chronic anemia with symptoms of fatigue/dizziness.     Patient had recent admission to [facility] as outlined below and discharged to TCU. Started having symptoms of fatigue, dizziness. HGB was checked and noted to be low at 6 reportedly. According to [facility] discharge summary, HGB in 8 range on discharge 4/20. No reports of melena, hematochezia. On admission here HGB initially 7.4 but dropped to 6.5. Received one unit PRBC with improvement to 7.6. Stool hemoccult positive. Since admission, she has had some intermittent confusion and has been stooling without any bleeding. On my exam, she is oriented x3 without asterixis.     Total bili stable 2.1 (improved from 4.6 4/14 with [facility]) with mild AST/ALT elevations. Iron studies showing elevated iron sat 66%, normal serum iron and low TIBC 157, ferritin elevated 287. INR not checked. Creatinine normal.     Had bedside paracentesis in the ER (only small amt removed), negative for SBP and received one dose of IV ceftriaxone.     Recent admission to [facility] 3/24-4/20 for hepatic encephalopathy and MINNIE at which time she underwent EGD/colonoscopy 4/14 which showed no evidence of active bleeding or varices, few ulcers in the  rectum where a single clot was noted suspicious for an area for bleeding and was clipped and injected with epinephrine. Bleeding was suspected to be related to rectal tube in the setting of coagulopathy. Mentation improved with lactulose. She did require ICU cares/intubation there and completed antibiotics for PNA. Following rectal tube removal she had large BRBPR with 4g HGB drop prompting above procedures. CT showed right sided rectal sheath hematoma and large ascites. HGB stabilized after 3 units PRBCs. In terms of diuretics, she was discharged on spironolactone 100mg/day, furosemide 20mg/day, and midodrine was stopped.    She follows with Tobin Pacheco CNP with our hepatology clinic. There is no mention of a history of Crohn's disease in our clinic's documentation.     PAST MEDICAL HISTORY:  Patient Active Problem List    Diagnosis Date Noted     Generalized muscle weakness 05/13/2021     Priority: Medium     Alcoholic cirrhosis of liver with ascites (H) 05/13/2021     Priority: Medium     Anemia, unspecified type 05/13/2021     Priority: Medium     Branch retinal vein occlusion of right eye 01/15/2014     Priority: Medium       ROS: A comprehensive ten point review of systems was negative aside from those in mentioned in the HPI.       MEDICATIONS:   Prior to Admission medications    Medication Sig Start Date End Date Taking? Authorizing Provider   acetaminophen (TYLENOL) 500 MG tablet Take 1,000 mg by mouth 2 times daily as needed for mild pain Maximum 2g/day.   Yes Unknown, Entered By History   albumin human 25 % bottle Inject into the vein as needed 8g for every liter of ascites removed.    Yes Unknown, Entered By History   albuterol (PROAIR HFA/PROVENTIL HFA/VENTOLIN HFA) 108 (90 Base) MCG/ACT inhaler Inhale 2 puffs into the lungs every 4 hours as needed for shortness of breath / dyspnea or wheezing   Yes Unknown, Entered By History   baclofen (LIORESAL) 10 MG tablet Take 10 mg by mouth 3 times daily   Yes  Unknown, Entered By History   cyanocobalamin (VITAMIN B-12) 100 MCG tablet Take 100 mcg by mouth daily   Yes Unknown, Entered By History   digoxin (LANOXIN) 125 MCG tablet Take 125 mcg by mouth daily   Yes Unknown, Entered By History   escitalopram (LEXAPRO) 20 MG tablet Take 20 mg by mouth daily   Yes Unknown, Entered By History   fluticasone (FLONASE) 50 MCG/ACT nasal spray Spray 2 sprays into both nostrils daily   Yes Unknown, Entered By History   furosemide (LASIX) 20 MG tablet Take 20 mg by mouth daily   Yes Unknown, Entered By History   lactulose (CHRONULAC) 10 GM/15ML solution Take 20 g by mouth daily   Yes Unknown, Entered By History   levETIRAcetam (KEPPRA) 750 MG tablet Take 750 mg by mouth 2 times daily   Yes Unknown, Entered By History   Lidocaine (LIDOCARE) 4 % Patch Place 1 patch onto the skin daily as needed for moderate pain Apply to back. ON for 12 hours, OFF for 12 hours.    To prevent lidocaine toxicity, patient should be patch free for 12 hrs daily.   Yes Unknown, Entered By History   loperamide (IMODIUM A-D) 2 MG tablet Take by mouth 4 times daily as needed for diarrhea 4mg after first loose stool, then 2mg after each additional loose stool.   Yes Unknown, Entered By History   Multiple Vitamins-Iron (DAILY MILLY MULTIVITAMIN/IRON) TABS Take 1 tablet by mouth daily Multivitamin - iron - folic acid   Yes Unknown, Entered By History   Nutritional Supplements (ENSURE PLUS) LIQD Take 240 mLs by mouth 3 times daily Will come on meal trays. Nurse to record ONLY amount consumed.   Yes Unknown, Entered By History   omeprazole (PRILOSEC) 40 MG DR capsule Take 40 mg by mouth 2 times daily (before meals)   Yes Unknown, Entered By History   ondansetron (ZOFRAN-ODT) 4 MG ODT tab Take 4 mg by mouth every 8 hours as needed for nausea   Yes Unknown, Entered By History   spironolactone (ALDACTONE) 100 MG tablet Take 100 mg by mouth daily   Yes Unknown, Entered By History   UNABLE TO FIND Apply topically 2 times  "daily MEDICATION NAME:  Barrier cream to the perineal area BID as indicated to keep irritants or moisture from skin surface. DX perineal rash.  Discontinue when healed. 4/26/21  Yes Unknown, Entered By History   vitamin C (ASCORBIC ACID) 500 MG tablet Take 500 mg by mouth daily   Yes Unknown, Entered By History        ALLERGIES:   Allergies   Allergen Reactions     Asacol [Mesalamine]      Aspirin      Other reaction(s): *Unknown     Desvenlafaxine Other (See Comments)     Other reaction(s): Dizziness  dizzy  dizzy       Ibuprofen      Other reaction(s): *Unknown     Lisinopril Cough     Pollen Extract      Other reaction(s): Runny Nose  Itchy eyes and headache     Sulfa Drugs      Sulfasalazine      Other reaction(s): *Unknown        SOCIAL HISTORY:  Social History     Tobacco Use     Smoking status: Not on file   Substance Use Topics     Alcohol use: Not on file     Drug use: Not on file        FAMILY HISTORY:  No family history on file.     PHYSICAL EXAM:   /51 (BP Location: Left arm)   Pulse 86   Temp 98.4  F (36.9  C) (Oral)   Resp 16   Ht 1.626 m (5' 4\")   Wt 65.8 kg (145 lb 1.6 oz)   SpO2 96%   BMI 24.91 kg/m       PHYSICAL EXAM:  General: alert, oriented, NAD  SKIN: no suspicious lesions, rashes, jaundice, or spider angiomas  HEAD: Normocephalic. No masses, lesions, tenderness or abnormalities  NECK: Neck supple. No adenopathy. Thyroid symmetric, normal size.  EYES: No scleral icterus  ENT: ENT exam normal, no neck nodes or sinus tenderness  RESPIRATORY: negative, Good diaphragmatic excursion. Lungs clear  CARDIOVASCULAR: negative, PMI normal. No lifts, heaves, or thrills. RRR. No murmurs, clicks gallops or rub  GASTROINTESTINAL: +BS, soft, mild diffuse tenderness, moderate distension, no HSM, no masses/guarding/rebound  JOINT/EXTREMITIES: extremities normal- no gross deformities noted, gait normal and normal muscle tone  NEURO: Reflexes grossly normal and symmetric. Sensation grossly " WNL.  PSYCH: no abnormal anxiety/depression  LYMPH: No anterior cervical, posterior cervical, or supraclavicular adenopathy     LABS:  I reviewed the patient's new clinical lab test results.   Recent Labs   Lab Test 05/14/21 0527 05/13/21 1949 05/13/21  1427   WBC 8.2  --  9.0   HGB 7.6* 6.5* 7.4*   *  --  108*     --  215     Recent Labs   Lab Test 05/14/21 0527 05/13/21  1427    133   POTASSIUM 3.5 4.2   CHLORIDE 105 103   CO2 26 25   BUN 11 10   ANIONGAP 6 5   BLAYNE 7.9* 8.1*     Recent Labs   Lab Test 05/14/21 0527 05/13/21 1427   ALBUMIN 1.8* 2.1*   BILITOTAL 2.5* 2.8*   ALT 24 33   AST 47* 95*   ALKPHOS 100 133        IMAGING  I personally reviewed the patient's new imaging results.       CT abd/pelvis 4/12 [facility]:  IMPRESSION:  1. Subacute appearing moderately large right rectus sheath hematoma right rectus abdominis.  2. Prominent diffuse ascites, some soft tissue edema and small pleural effusions for significant third-spacing.   3. Cirrhotic liver. Pericecal, right abdominal wall, periumbilical and perirectal varices.  4. Emphysema in the visualized lung parenchyma.    CONSULTATION ASSESSMENT AND PLAN:    65 year old female with past medical history significant for ETOH liver cirrhosis complicated by GI bleeding, esophageal varices, ascites, and hepatic encephalopathy, reported history of Crohn's disease (not on any maintenance therapy), COPD, A fib with RVR not on anticoagulation, MDD, anxiety, bipolar disorder, possible seizure on Keppra, admitted with acute on chronic anemia with symptoms of fatigue/dizziness.     1. Anemia. Does not appear to have acute GI bleeding. May have some occult blood loss from known portal gastropathy. No evidence of varices on recent EGD on 4/14. No bright red blood to indicate recurrent bleeding from rectal ulcers that should have healed and were appropriately treated with clip/injection on 4/14 and felt likely secondary to rectal tube during her recent  [facility] admission. She was taking PPI BID prior to admission.    --Continue PPI BID.    --Monitor HGB and transfuse prn.   --Monitor stool output for overt bleeding.   --Do not anticipate EGD will be needed.   --Keep NPO for now.     2. ETOH liver cirrhosis. Complicated by hepatic encephalopathy, diuretic refractory ascites requiring frequent paracentesis, esophageal varices, hepatic encephalopathy. Followed by Tobin Pacheco CNP [facility]. Having mild confusion this admit. Ammonia level elevated at 58. Paracentesis 5/13 negative for SBP. Total bili stable. Creatinine normal. INR not checked this admit.  --Lactulose 10-20g TID. Titrate to goal 3 stools a day.  --Diuretics previously held with plans to resume today.   --Larger volume paracentesis recommended with IV albumin given abdominal pain/distension and only small amt removed in ER.   --2g Na diet when resumed.   --Check INR/LFTs/bili tomorrow to calculate MELD.    --Outpatient follow up with [facility] post-discharge.     3. Crohn's disease. Unclear on this diagnosis. No signs of active Crohn's on EGD or colonoscopy. Not on any maintenance medications.     Will review with Dr. Santana.     Thank you for asking us to participate in the care of this patient.    BRENDA Finch  [facility] ([facility])

## 2021-05-14 NOTE — PROGRESS NOTES
Paracentesis complete. Consent obtained with Dr. Perze.  Pt tolerated well, drained 3000mls straw colored fluid. Site at RLQ. Site covered with bandage. Reviewed discharge instructions with pt. Pt discharged to floor by wheelchair. Report called to RN.  
Patient's After Visit Summary was offered to be reviewed with patient and pt refused saying packet could be sent to TCU  Patient was given an opportunity to ask questions.   Discharge medications sent home with patient/family: Not applicable   Discharged with other: [facility]     OBSERVATION patient END time: 1970                
Pt refusing lactulose, pt aware ammonia level is still elevated at 58, pt reports she is very tired from stooling all night and does not want to take at this time.   
well-groomed/well-developed/well-nourished/no distress

## 2021-05-14 NOTE — PLAN OF CARE
PRIMARY DIAGNOSIS: GENERALIZED WEAKNESS    OUTPATIENT/OBSERVATION GOALS TO BE MET BEFORE DISCHARGE  1. Orthostatic performed: N/A    2. Tolerating PO medications: Yes    3. Return to near baseline physical activity: No    4. Cleared for discharge by consultants (if involved): No    Pt is A&O x2, disoriented to place and situation. Pt repeats questions. VSS. Reports abdominal pain that has been going on for 4 weeks, declines intervention. IV SL. Assist x2, but has not gotten OOB. Incontinent of bowel and bladder. Tolerating regular diet, NPO at midnight. Hgb at 2000 was 6.5, transfusing one unit of RBC and Hgb recheck in the morning. Will provide supportive care.      Discharge Planner Nurse   Safe discharge environment identified: No  Barriers to discharge: Yes       Entered by: Araseli Corral 05/13/2021 9:28 PM     Please review provider order for any additional goals.   Nurse to notify provider when observation goals have been met and patient is ready for discharge.

## 2021-05-14 NOTE — PROVIDER NOTIFICATION
DATE:  5/13/2021   TIME OF RECEIPT FROM LAB:  2017  LAB TEST:  HGB  LAB VALUE:  6.5  RESULTS GIVEN WITH READ-BACK TO (PROVIDER):  Sherif Packer MD  TIME LAB VALUE REPORTED TO PROVIDER:   2021

## 2021-05-14 NOTE — PHARMACY-ADMISSION MEDICATION HISTORY
Admission medication history interview status for this patient is complete. See [facility] admission navigator for allergy information, prior to admission medications and immunization status.     Medication history interview done, indicate source(s): paper med list  Medication history resources (including written lists, pill bottles, clinic record):[facility].    Changes made to PTA medication list:  Added: all    Prior to Admission medications    Medication Sig Last Dose Taking? Auth Provider   acetaminophen (TYLENOL) 500 MG tablet Take 1,000 mg by mouth 2 times daily as needed for mild pain Maximum 2g/day.  Yes Unknown, Entered By History   albumin human 25 % bottle Inject into the vein as needed 8g for every liter of ascites removed.   Yes Unknown, Entered By History   albuterol (PROAIR HFA/PROVENTIL HFA/VENTOLIN HFA) 108 (90 Base) MCG/ACT inhaler Inhale 2 puffs into the lungs every 4 hours as needed for shortness of breath / dyspnea or wheezing  Yes Unknown, Entered By History   baclofen (LIORESAL) 10 MG tablet Take 10 mg by mouth 3 times daily 5/13/2021 at Unknown time Yes Unknown, Entered By History   cyanocobalamin (VITAMIN B-12) 100 MCG tablet Take 100 mcg by mouth daily 5/13/2021 at Unknown time Yes Unknown, Entered By History   digoxin (LANOXIN) 125 MCG tablet Take 125 mcg by mouth daily 5/13/2021 at Unknown time Yes Unknown, Entered By History   escitalopram (LEXAPRO) 20 MG tablet Take 20 mg by mouth daily 5/13/2021 at Unknown time Yes Unknown, Entered By History   fluticasone (FLONASE) 50 MCG/ACT nasal spray Spray 2 sprays into both nostrils daily 5/13/2021 at Unknown time Yes Unknown, Entered By History   furosemide (LASIX) 20 MG tablet Take 20 mg by mouth daily 5/13/2021 at Unknown time Yes Unknown, Entered By History   lactulose (CHRONULAC) 10 GM/15ML solution Take 20 g by mouth daily 5/13/2021 at Unknown time Yes Unknown, Entered By History   levETIRAcetam (KEPPRA) 750 MG  tablet Take 750 mg by mouth 2 times daily 5/13/2021 at am Yes Unknown, Entered By History   Lidocaine (LIDOCARE) 4 % Patch Place 1 patch onto the skin daily as needed for moderate pain Apply to back. ON for 12 hours, OFF for 12 hours.    To prevent lidocaine toxicity, patient should be patch free for 12 hrs daily. 5/12/2021 at 11am Yes Unknown, Entered By History   loperamide (IMODIUM A-D) 2 MG tablet Take by mouth 4 times daily as needed for diarrhea 4mg after first loose stool, then 2mg after each additional loose stool.  Yes Unknown, Entered By History   Multiple Vitamins-Iron (DAILY MILLY MULTIVITAMIN/IRON) TABS Take 1 tablet by mouth daily Multivitamin - iron - folic acid 5/13/2021 at Unknown time Yes Unknown, Entered By History   Nutritional Supplements (ENSURE PLUS) LIQD Take 240 mLs by mouth 3 times daily Will come on meal trays. Nurse to record ONLY amount consumed.  Yes Unknown, Entered By History   omeprazole (PRILOSEC) 40 MG DR capsule Take 40 mg by mouth 2 times daily (before meals) 5/13/2021 at am Yes Unknown, Entered By History   ondansetron (ZOFRAN-ODT) 4 MG ODT tab Take 4 mg by mouth every 8 hours as needed for nausea  Yes Unknown, Entered By History   spironolactone (ALDACTONE) 100 MG tablet Take 100 mg by mouth daily 5/13/2021 at am Yes Unknown, Entered By History   UNABLE TO FIND Apply topically 2 times daily MEDICATION NAME:  Barrier cream to the perineal area BID as indicated to keep irritants or moisture from skin surface. DX perineal rash.  Discontinue when healed.  Yes Unknown, Entered By History   vitamin C (ASCORBIC ACID) 500 MG tablet Take 500 mg by mouth daily 5/13/2021 at Unknown time Yes Unknown, Entered By History

## 2021-05-14 NOTE — DISCHARGE SUMMARY
[facility]  Hospitalist Discharge Summary      Date of Admission:  5/13/2021  Date of Discharge:  5/14/2021  3:27 PM  Discharging Provider: Janene Ricardo PA-C      Discharge Diagnoses   Symptomatic anemia  Liver cirrhosis with ascites     Follow-ups Needed After Discharge   Follow-up Appointments     Follow Up and recommended labs and tests      Follow up with primary care provider in 1-2 weeks.  Recheck Hemoglobin.  Follow up with GI in 2-4 weeks.  Lactulose was increased to BID here but became incontinent of stool,   continue daily on discharge.             Unresulted Labs Ordered in the Past 30 Days of this Admission     Date and Time Order Name Status Description    5/14/2021 0001 Keppra (Levetiracetam) Level In process     5/13/2021 1520 Fluid Culture Aerobic Bacterial In process       These results will be followed up by PCP    Discharge Disposition   Discharged to rehabilitation facility  Condition at discharge: Stable  Patient ready to discharge to a skilled nursing facility as soon as possible in order to create capacity for patients related to the COVID-19 pandemic.    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 fib, tobacco dependence, bipolar disorder, anxiety, and anemia, who was admitted on 5/13/2021 with fatigue and dizziness and noted to have a hgb in the 6 range at TCU. She was hospitalized at [facility] from 3/24-4/20 for pneumonia, failure to thrive, encephalopathy.  She reluctantly discharged to TCU where she has been since then.  She reports persistent fatigue and dizziness although she tells me this has been for the past 1 year.  Perhaps the fatigue is a little bit more this past week or 2.  They checked a hemoglobin and this was reportedly in the 6  range so she was sent to the ER for evaluation.  She denies any nausea, vomiting, melena, hematochezia.  She does have diffuse abdominal discomfort which is chronic for her and she said this is when she is filling up fluid in her belly.  Her last paracentesis recorded through the system was 5/3 although when asked her how often she gets it she says every few days or whenever she feels up.  She cannot tell me when her last paracentesis was.  She does report some lightheadedness when up, but no previous syncope.  Denies any chest pain, cough, shortness of breath, fevers, chills.   In the emergency department, blood pressure 121/61, heart rate 86, temperature 98.1  F, oxygen 98% on room air.  WBC 9.0, hemoglobin 7.4, platelet count 215.  Stool occult blood is positive.  BMP WNL.  AST elevated 95, ALT 33, alk phos 133, bilirubin 2.8, albumin 2.1.  Ammonia level elevated at 58 although she does not appear encephalopathic.  Ferritin 287, iron 103, iron binding capacity 157, iron saturation deck 66.  COVID-19 PCR negative.  A diagnostic paracentesis was done in the ER.  She received 2 g IV ceftriaxone while awaiting results in case of SBP.  Peritoneal fluid shows 82 WBCs with 43% neutrophils not consistent with SBP.  She did receive 40 mg IV Protonix in case of a slow upper GI bleed.  Observation bed has been requested for serial hemoglobin and possible need for transfusion and GI consultation.   Repeat hgb was 6.5 so 1 unit of PRBCs was transfused and hgb improved to 7.6. GI was consulted given her hx of varices. Repeat EGD not recommended at this time as there is very low suspicion for active variceal bleed. GI did recommend therapeutic paracentesis prior to discharge. This was done and 3000 ml of fluid was removed. Repeat hemoglobin was ordered but pt declined. Home lactulose was increased to BID but pt had several loose stools, some incontinent, so returned to daily dosing on discharge. Ammonia level remained stable.  Pt was instructed to follow up with her liver specialist and PCP.      Consultations This Hospital Stay   GASTROENTEROLOGY IP CONSULT  CARE MANAGEMENT / SOCIAL WORK IP CONSULT  PHYSICAL THERAPY ADULT IP CONSULT  OCCUPATIONAL THERAPY ADULT IP CONSULT    Code Status   Full Code    Time Spent on this Encounter   IJanene PA-C, personally saw the patient today and spent greater than 30 minutes discharging this patient.       Janene Ricardo PA-C  [facility] OBSERVATION DEPT  [address] [facility] [address]  Phone: [phone]  ______________________________________________________________________    Physical Exam   Vital Signs: Temp: 98.4  F (36.9  C) Temp src: Oral BP: 120/58 Pulse: 86   Resp: 16 SpO2: 96 % O2 Device: None (Room air)    Weight: 145 lbs 1.6 oz    GENERAL:  Comfortable.  PSYCH: pleasant, oriented, No acute distress.  HEART:  Irregular rhythm. Normal S1, S2 with murmur, no pericardial rub, gallops or S3 or S4.  LUNGS:  Clear to auscultation, normal Respiratory effort. No wheezing, rales or ronchi.  GI:  Soft, distended, Non-tender.   EXTREMITIES:  Able to move all 4 extremities.   SKIN:  Dry to touch, No rash, wound or ulcerations.  NEUROLOGIC:  Grossly intact       Primary Care Physician   Diana Bañuelos    Discharge Orders      General info for SNF    Length of Stay Estimate: Short Term Care: Estimated # of Days <30  Condition at Discharge: Stable  Level of care:skilled   Rehabilitation Potential: Good  Admission H&P remains valid and up-to-date: Yes  Recent Chemotherapy: N/A  Use Nursing Home Standing Orders: Yes     Mantoux instructions    Give two-step Mantoux (PPD) Per Facility Policy Yes     Reason for your hospital stay    Symptomatic anemia, possibly due to subacute upper GI bleed versus anemia of chronic disease. You stool was positive for blood and you do have known esophageal varices related to liver cirrhosis but we do not suspect variceal  bleeding as your vital signs are stable and no noted hematemesis or melena here. You did receive 1 unit of PRBCs and your hemoglobin improved. GI was consulted and did not recommend repeat EGD at this time. Your symptoms improved. You also underwent therapeutic paracentesis prior to discharge.     Intake and output    Every shift     Daily weights    Call Provider for weight gain of more than 2 pounds per day or 5 pounds per week.     Follow Up and recommended labs and tests    Follow up with primary care provider in 1-2 weeks.  Recheck Hemoglobin.  Follow up with GI in 2-4 weeks.  Lactulose was increased to BID here but became incontinent of stool, continue daily on discharge.     Additional Discharge Instructions    Resume previous discharge instructions from previous hospital discharge.     Activity - Up with nursing assistance     Full Code     Physical Therapy Adult Consult    Evaluate and treat as clinically indicated.    Reason:  Deconditioning, liver cirrhosis     Occupational Therapy Adult Consult    Evaluate and treat as clinically indicated.    Reason:  Deconditioning, liver cirrhosis     Advance Diet as Tolerated    Follow this diet upon discharge: Regular       Significant Results and Procedures   Most Recent 3 CBC's:  Recent Labs   Lab Test 05/14/21 0527 05/13/21 1949 05/13/21 1427   WBC 8.2  --  9.0   HGB 7.6* 6.5* 7.4*   *  --  108*     --  215     Most Recent 3 BMP's:  Recent Labs   Lab Test 05/14/21 0527 05/13/21  1427    133   POTASSIUM 3.5 4.2   CHLORIDE 105 103   CO2 26 25   BUN 11 10   CR 0.69 0.65   ANIONGAP 6 5   BLAYNE 7.9* 8.1*   GLC 86 112*     Most Recent 2 LFT's:  Recent Labs   Lab Test 05/14/21 0527 05/13/21  1427   AST 47* 95*   ALT 24 33   ALKPHOS 100 133   BILITOTAL 2.5* 2.8*     Most Recent 3 Hemoglobins:  Recent Labs   Lab Test 05/14/21 0527 05/13/21 1949 05/13/21  1427   HGB 7.6* 6.5* 7.4*     Most Recent 6 Bacteria Isolates From Any Culture (See EPIC  Reports for Culture Details):  Recent Labs   Lab Test 05/13/21  1544   CULT Culture negative monitoring continues   ,   Results for orders placed or performed during the hospital encounter of 05/13/21   US Paracentesis    Narrative    ULTRASOUND GUIDED PARACENTESIS   5/14/2021 11:11 AM     HISTORY: Ascites    PROCEDURE:   Informed consent was obtained from the patient prior to  the procedure with discussion including the possible risks of  bleeding, infection and organ injury . Using 5 mL of 1% lidocaine for  local anesthesia, sterile technique, and sonographic guidance with  permanent image documentation, I placed an 8F paracentesis catheter  into the peritoneal fluid collection. This was used to aspirate 3000  mL of yellow, serous fluid in vacuum bottles, and some of this was  sent for any laboratory studies that had been ordered. There were no  immediate complications.  Intravenous albumen replacement was  performed according to protocol.      Impression    IMPRESSION:  Ultrasound guided paracentesis.    MICHAEL KRAMER MD       Discharge Medications   Current Discharge Medication List      CONTINUE these medications which have NOT CHANGED    Details   acetaminophen (TYLENOL) 500 MG tablet Take 1,000 mg by mouth 2 times daily as needed for mild pain Maximum 2g/day.      albumin human 25 % bottle Inject into the vein as needed 8g for every liter of ascites removed.       albuterol (PROAIR HFA/PROVENTIL HFA/VENTOLIN HFA) 108 (90 Base) MCG/ACT inhaler Inhale 2 puffs into the lungs every 4 hours as needed for shortness of breath / dyspnea or wheezing    Comments: Pharmacy may dispense brand covered by insurance (Proair, or proventil or ventolin or generic albuterol inhaler)      baclofen (LIORESAL) 10 MG tablet Take 10 mg by mouth 3 times daily      cyanocobalamin (VITAMIN B-12) 100 MCG tablet Take 100 mcg by mouth daily      digoxin (LANOXIN) 125 MCG tablet Take 125 mcg by mouth daily      escitalopram (LEXAPRO) 20  MG tablet Take 20 mg by mouth daily      fluticasone (FLONASE) 50 MCG/ACT nasal spray Spray 2 sprays into both nostrils daily      furosemide (LASIX) 20 MG tablet Take 20 mg by mouth daily      lactulose (CHRONULAC) 10 GM/15ML solution Take 20 g by mouth daily      levETIRAcetam (KEPPRA) 750 MG tablet Take 750 mg by mouth 2 times daily      Lidocaine (LIDOCARE) 4 % Patch Place 1 patch onto the skin daily as needed for moderate pain Apply to back. ON for 12 hours, OFF for 12 hours.    To prevent lidocaine toxicity, patient should be patch free for 12 hrs daily.      loperamide (IMODIUM A-D) 2 MG tablet Take by mouth 4 times daily as needed for diarrhea 4mg after first loose stool, then 2mg after each additional loose stool.      Multiple Vitamins-Iron (DAILY MILLY MULTIVITAMIN/IRON) TABS Take 1 tablet by mouth daily Multivitamin - iron - folic acid      Nutritional Supplements (ENSURE PLUS) LIQD Take 240 mLs by mouth 3 times daily Will come on meal trays. Nurse to record ONLY amount consumed.      omeprazole (PRILOSEC) 40 MG DR capsule Take 40 mg by mouth 2 times daily (before meals)      ondansetron (ZOFRAN-ODT) 4 MG ODT tab Take 4 mg by mouth every 8 hours as needed for nausea      spironolactone (ALDACTONE) 100 MG tablet Take 100 mg by mouth daily      UNABLE TO FIND Apply topically 2 times daily MEDICATION NAME:  Barrier cream to the perineal area BID as indicated to keep irritants or moisture from skin surface. DX perineal rash.  Discontinue when healed.      vitamin C (ASCORBIC ACID) 500 MG tablet Take 500 mg by mouth daily           Allergies   Allergies   Allergen Reactions     Asacol [Mesalamine]      Aspirin      Other reaction(s): *Unknown     Desvenlafaxine Other (See Comments)     Other reaction(s): Dizziness  dizzy  dizzy       Ibuprofen      Other reaction(s): *Unknown     Lisinopril Cough     Pollen Extract      Other reaction(s): Runny Nose  Itchy eyes and headache     Sulfa Drugs      Sulfasalazine       Other reaction(s): *Unknown

## 2021-05-14 NOTE — PLAN OF CARE
PRIMARY DIAGNOSIS: GENERALIZED WEAKNESS    OUTPATIENT/OBSERVATION GOALS TO BE MET BEFORE DISCHARGE  1. Orthostatic performed: N/A    2. Tolerating PO medications: Yes    3. Return to near baseline physical activity: No    4. Cleared for discharge by consultants (if involved): No    Pt is alert to self only, answers orientation questions correctly but confused. Pt repeats questions. VSS. Reports abdominal pain that has been going on for 4 weeks, declines intervention. IV SL. Assist x2, but has not gotten OOB. Incontinent of bowel and bladder. Pt has had multiple watery stools. NPO at midnight. Transfused one unit of RBC and Hgb recheck in the morning. Will provide supportive care.    Discharge Planner Nurse   Safe discharge environment identified: No  Barriers to discharge: Yes       Entered by: Araseli Corral 05/14/2021 4:39 AM     Please review provider order for any additional goals.   Nurse to notify provider when observation goals have been met and patient is ready for discharge.

## 2021-05-14 NOTE — PLAN OF CARE
PRIMARY DIAGNOSIS: GENERALIZED WEAKNESS    OUTPATIENT/OBSERVATION GOALS TO BE MET BEFORE DISCHARGE  1. Orthostatic performed: N/A    2. Tolerating PO medications: Yes    3. Return to near baseline physical activity: Yes, pt reports she has help from her boyfriend when she uses walker. Otherwise, she uses wheelchair at home. Pt up with assist of 1, walker and gait belt to bathroom    4. Cleared for discharge by consultants (if involved): No, GI consulted     Pt is alert and oriented at this time, confusion overnight. Pt was changed in bed with stool incontinence overnight. Pt up with assist of 1, gaitbelt and walker to bathroom this morning. Pt reports she still feels weak. Hgb recheck was 7.6, one unit of blood given on 5/13. Pt reports chronic pain 8/10.     Discharge Planner Nurse   Safe discharge environment identified: No  Barriers to discharge: Yes       Entered by: Tito Ng 05/14/2021 10:34 AM     Please review provider order for any additional goals.   Nurse to notify provider when observation goals have been met and patient is ready for discharge.

## 2021-05-14 NOTE — PLAN OF CARE
PRIMARY DIAGNOSIS: GENERALIZED WEAKNESS    OUTPATIENT/OBSERVATION GOALS TO BE MET BEFORE DISCHARGE  1. Orthostatic performed: N/A    2. Tolerating PO medications: Yes    3. Return to near baseline physical activity: Yes, pt reports she has help from her boyfriend when she uses walker. Otherwise, she uses wheelchair at home. Pt up with assist of 1, walker and gait belt to bathroom    4. Cleared for discharge by consultants (if involved): No, GI consulted     Pt is alert and oriented at this time, confusion overnight. Pt was changed in bed with stool incontinence overnight. Pt up with assist of 1, gaitbelt and walker to bathroom this morning. Pt reports she still feels weak. Hgb recheck was 7.6, one unit of blood given on 5/13. Paracentesis completed, 3L of fluid removed.     Discharge Planner Nurse   Safe discharge environment identified: No  Barriers to discharge: Yes       Entered by: Tito Ng 05/14/2021 12:56 PM     Please review provider order for any additional goals.   Nurse to notify provider when observation goals have been met and patient is ready for discharge.

## 2021-05-14 NOTE — IR NOTE
ULTRASOUND GUIDED PARACENTESIS   5/14/2021 11:11 AM     HISTORY: Ascites    PROCEDURE:   Informed consent was obtained from the patient prior to the procedure with discussion including the possible risks of bleeding, infection and organ injury . Using 5 mL of 1% lidocaine for local anesthesia, sterile technique, and sonographic guidance with permanent image documentation, I placed an 8F paracentesis catheter into the peritoneal fluid collection. This was used to aspirate 3000 mL of yellow, serous fluid in vacuum bottles, and some of this was sent for any laboratory studies that had been ordered. There were no immediate complications.  Intravenous albumen replacement was performed according to protocol.    IMPRESSION:  Ultrasound guided paracentesis.

## 2021-05-14 NOTE — PLAN OF CARE
PRIMARY DIAGNOSIS: GENERALIZED WEAKNESS    OUTPATIENT/OBSERVATION GOALS TO BE MET BEFORE DISCHARGE  1. Orthostatic performed: N/A    2. Tolerating PO medications: Yes    3. Return to near baseline physical activity: No    4. Cleared for discharge by consultants (if involved): No    Pt is alert to self only. Pt repeats questions. VSS. Reports abdominal pain that has been going on for 4 weeks, declines intervention. IV SL. Assist x2, but has not gotten OOB. Incontinent of bowel and bladder. NPO at midnight. Transfused one unit of RBC and Hgb recheck in the morning. Will provide supportive care.       Discharge Planner Nurse   Safe discharge environment identified: No  Barriers to discharge: Yes       Entered by: Araseli Corral 05/14/2021 1:23 AM     Please review provider order for any additional goals.   Nurse to notify provider when observation goals have been met and patient is ready for discharge.

## 2021-05-14 NOTE — PLAN OF CARE
ROOM # 229    Living Situation (if not independent, order SW consult): With boyfriend  Facility name:  : [facility] ([phone])    Activity level at baseline: Walker and wheelchair use   Activity level on admit: Assist x2      Patient registered to observation; given Patient Bill of Rights; given the opportunity to ask questions about observation status and their plan of care.  Patient has been oriented to the observation room, bathroom and call light is in place.    Discussed discharge goals and expectations with patient/family.

## 2021-05-14 NOTE — CONSULTS
Care Management Follow Up    Length of Stay (days): 0    Expected Discharge Date: 05/15/21     Concerns to be Addressed: care coordination/care conferences, discharge planning     Patient plan of care discussed at interdisciplinary rounds: Yes    Anticipated Discharge Disposition: Transitional Care     Anticipated Discharge Services: None  Anticipated Discharge DME: None    Referrals Placed by CM/SW: Post Acute Facilities  Private pay costs discussed: Not applicable    Additional Information:  Spoke with admissions and SW at [facility] in [place]. Patient has been in TCU at [facility] since 4/20/21. [facility] is expecting patient to return upon discharge.     Transportation TBD.     Update 1030: Notified by provider that patient will be ready for discharge today.  [facility] transportation scheduled for 1515 today.    Will confirm discharge plans with the patient once returns from procedure.     Update 1120: Spoke with patient to update on discharge planning. Patient agreeable to returning to [facility] TCU. Orders sent to admissions.       Brandie Peacock, RN      Brandie Peacock RN Case Manager  Inpatient Care Coordination  [facility]   [phone]

## 2021-05-15 LAB — LEVETIRACETAM SERPL-MCNC: 53 UG/ML (ref 12–46)

## 2021-05-18 LAB
BACTERIA SPEC CULT: NO GROWTH
SPECIMEN SOURCE: NORMAL

## 2021-05-20 ENCOUNTER — DOCUMENTATION ONLY (OUTPATIENT)
Dept: OTHER | Facility: CLINIC | Age: 66
End: 2021-05-20